# Patient Record
Sex: MALE | Race: WHITE | URBAN - METROPOLITAN AREA
[De-identification: names, ages, dates, MRNs, and addresses within clinical notes are randomized per-mention and may not be internally consistent; named-entity substitution may affect disease eponyms.]

---

## 2019-02-01 ENCOUNTER — HOSPITAL ENCOUNTER (EMERGENCY)
Facility: CLINIC | Age: 39
Discharge: HOME | End: 2019-02-01
Attending: EMERGENCY MEDICINE | Admitting: EMERGENCY MEDICINE
Payer: COMMERCIAL

## 2019-02-01 ENCOUNTER — APPOINTMENT (OUTPATIENT)
Dept: CT IMAGING | Facility: CLINIC | Age: 39
End: 2019-02-01
Payer: COMMERCIAL

## 2019-02-01 VITALS
RESPIRATION RATE: 18 BRPM | HEIGHT: 70 IN | TEMPERATURE: 97.7 F | WEIGHT: 265 LBS | SYSTOLIC BLOOD PRESSURE: 144 MMHG | DIASTOLIC BLOOD PRESSURE: 72 MMHG | HEART RATE: 77 BPM | BODY MASS INDEX: 37.94 KG/M2 | OXYGEN SATURATION: 98 %

## 2019-02-01 DIAGNOSIS — R42 LIGHTHEADEDNESS: ICD-10-CM

## 2019-02-01 DIAGNOSIS — R42 DIZZINESS: ICD-10-CM

## 2019-02-01 LAB
ALBUMIN SERPL-MCNC: 4.2 G/DL (ref 3.4–5)
ALP SERPL-CCNC: 75 U/L (ref 40–150)
ALT SERPL W P-5'-P-CCNC: 37 U/L (ref 0–70)
ANION GAP SERPL CALCULATED.3IONS-SCNC: 3 MMOL/L (ref 3–14)
AST SERPL W P-5'-P-CCNC: 18 U/L (ref 0–45)
BASOPHILS # BLD AUTO: 0.1 10E9/L (ref 0–0.2)
BASOPHILS NFR BLD AUTO: 0.6 %
BILIRUB SERPL-MCNC: 0.7 MG/DL (ref 0.2–1.3)
BUN SERPL-MCNC: 21 MG/DL (ref 7–30)
CALCIUM SERPL-MCNC: 9.4 MG/DL (ref 8.5–10.1)
CHLORIDE SERPL-SCNC: 107 MMOL/L (ref 94–109)
CO2 SERPL-SCNC: 28 MMOL/L (ref 20–32)
CREAT SERPL-MCNC: 1 MG/DL (ref 0.66–1.25)
DIFFERENTIAL METHOD BLD: NORMAL
EOSINOPHIL # BLD AUTO: 0.3 10E9/L (ref 0–0.7)
EOSINOPHIL NFR BLD AUTO: 3.3 %
ERYTHROCYTE [DISTWIDTH] IN BLOOD BY AUTOMATED COUNT: 13.7 % (ref 10–15)
GFR SERPL CREATININE-BSD FRML MDRD: >90 ML/MIN/{1.73_M2}
GLUCOSE SERPL-MCNC: 84 MG/DL (ref 70–99)
HCT VFR BLD AUTO: 45.7 % (ref 40–53)
HGB BLD-MCNC: 16 G/DL (ref 13.3–17.7)
IMM GRANULOCYTES # BLD: 0 10E9/L (ref 0–0.4)
IMM GRANULOCYTES NFR BLD: 0.3 %
LYMPHOCYTES # BLD AUTO: 2.3 10E9/L (ref 0.8–5.3)
LYMPHOCYTES NFR BLD AUTO: 26.8 %
MCH RBC QN AUTO: 30.4 PG (ref 26.5–33)
MCHC RBC AUTO-ENTMCNC: 35 G/DL (ref 31.5–36.5)
MCV RBC AUTO: 87 FL (ref 78–100)
MONOCYTES # BLD AUTO: 0.7 10E9/L (ref 0–1.3)
MONOCYTES NFR BLD AUTO: 7.9 %
NEUTROPHILS # BLD AUTO: 5.3 10E9/L (ref 1.6–8.3)
NEUTROPHILS NFR BLD AUTO: 61.1 %
NRBC # BLD AUTO: 0 10*3/UL
NRBC BLD AUTO-RTO: 0 /100
PLATELET # BLD AUTO: 203 10E9/L (ref 150–450)
POTASSIUM SERPL-SCNC: 3.9 MMOL/L (ref 3.4–5.3)
PROT SERPL-MCNC: 8.2 G/DL (ref 6.8–8.8)
RBC # BLD AUTO: 5.27 10E12/L (ref 4.4–5.9)
SODIUM SERPL-SCNC: 138 MMOL/L (ref 133–144)
TROPONIN I SERPL-MCNC: <0.015 UG/L (ref 0–0.04)
WBC # BLD AUTO: 8.7 10E9/L (ref 4–11)

## 2019-02-01 PROCEDURE — 85025 COMPLETE CBC W/AUTO DIFF WBC: CPT | Performed by: EMERGENCY MEDICINE

## 2019-02-01 PROCEDURE — 99284 EMERGENCY DEPT VISIT MOD MDM: CPT | Mod: 25

## 2019-02-01 PROCEDURE — 80053 COMPREHEN METABOLIC PANEL: CPT | Performed by: EMERGENCY MEDICINE

## 2019-02-01 PROCEDURE — 70450 CT HEAD/BRAIN W/O DYE: CPT

## 2019-02-01 PROCEDURE — 84484 ASSAY OF TROPONIN QUANT: CPT | Performed by: EMERGENCY MEDICINE

## 2019-02-01 RX ORDER — SODIUM CHLORIDE 9 MG/ML
1000 INJECTION, SOLUTION INTRAVENOUS CONTINUOUS
Status: DISCONTINUED | OUTPATIENT
Start: 2019-02-01 | End: 2019-02-01 | Stop reason: HOSPADM

## 2019-02-01 ASSESSMENT — ENCOUNTER SYMPTOMS
WEAKNESS: 0
FATIGUE: 1
LIGHT-HEADEDNESS: 1
NUMBNESS: 0
SHORTNESS OF BREATH: 0

## 2019-02-01 ASSESSMENT — MIFFLIN-ST. JEOR: SCORE: 2123.28

## 2019-02-01 NOTE — ED AVS SNAPSHOT
Emergency Department  [address] [place] [address]  Phone:  [phone]  Fax:  [phone]                                    Mark Jones   MRN: [id number]    Department:   Emergency Department   Date of Visit:  2/1/2019           After Visit Summary Signature Page    I have received my discharge instructions, and my questions have been answered. I have discussed any challenges I see with this plan with the nurse or doctor.    ..........................................................................................................................................  Patient/Patient Representative Signature      ..........................................................................................................................................  Patient Representative Print Name and Relationship to Patient    ..................................................               ................................................  Date                                   Time    ..........................................................................................................................................  Reviewed by Signature/Title    ...................................................              ..............................................  Date                                               Time          22EPIC Rev 08/18

## 2019-02-01 NOTE — ED PROVIDER NOTES
History     Chief Complaint:  Lighteadedness      HPI   Mark Jones is a 38 year old male who presents with lightheadedness. The patient states that around 1000 he was at work in a meeting and he had a 5-10 second episode of feeling extremely lightheaded after turning his head side to side. He felt like he was close to having a syncopal episode. Since then, he has felt slightly lightheaded and fatigued. The patient denies any loss of ability to perform tasks, chest pain, shortness of breath, weakness, numbness, leg pain, or leg swelling. He reports that sometimes he feels lightheaded after not getting enough sleep, but he has never experienced severe lightheadedness like he did today. The patient reports that he had a tooth extraction last week.       CARDIAC RISK FACTORS:  Sex:    Male  Tobacco:   negative   Hypertension:   negative  Hyperlipidemia:  negative  Diabetes:   negative     PE/DVT RISK FACTORS:  Sex:    Male  Hormones:   negative   Tobacco:   negative   Cancer:   negative   Travel:   negative   Surgery:   Tooth extraction last week.  Other immobilization: negative   Personal history:  negative   Family history:  negative       Allergies:  No known allergies     Medications:    The patient is currently on no regular medications.     Past Medical History:    The patient denies any significant past medical history.    Past Surgical History:    The patient does not have any pertinent past surgical history.    Family History:    No past pertinent family history.    Social History:  Patient presents with wife  Negative for tobacco use.  Positive for alcohol use.   Marital Status:       Review of Systems   Constitutional: Positive for fatigue.   Respiratory: Negative for shortness of breath.    Cardiovascular: Negative for chest pain and leg swelling.   Neurological: Positive for light-headedness. Negative for weakness and numbness.   10 point review of systems performed and is negative except as  "above and in HPI.    Physical Exam   First Vitals:  BP: 144/72  Pulse: 77  Temp: 97.7  F (36.5  C)  Resp: 18  Height: 177.8 cm (5' 10\")  Weight: 120.2 kg (265 lb)  SpO2: 98 %      Physical Exam  General: Resting on the gurney, appears comfortable  Head:  The scalp, face, and head appear normal  Mouth/Throat: Mucus membranes are moist  Neck:   No nuchal rigidity   CV:  Regular rate    Normal S1 and S2  No pathological murmur   Resp:  Breath sounds clear and equal bilaterally    Non-labored, no retractions or accessory muscle use    No coarseness    No wheezing   GI:  Abdomen is soft, no rigidity    No tenderness to palpation  MS:  Normal motor assessment of all extremities.    Good capillary refill noted.  Skin:  No rash or lesions noted.  Neuro:  Speech is normal and fluent. No apparent deficit. Cranial nerves II-XII intact by examination. Sensation and strength intact x4  Psych:  Awake. Alert.  Normal affect.      Appropriate interactions.    Emergency Department Course   Imaging:  Radiographic findings were communicated with the patient who voiced understanding of the findings.  CT Head w/o contrast:   No evidence of acute intracranial hemorrhage, mass, or herniation.  per radiology.      Laboratory:  CBC: WBC: 8.7, HGB: 16.0, PLT: 203  CMP: WNL (Creatinine: 1.00)(glucose: 84)  Troponin:  <0.015    Emergency Department Course:  Nursing notes and vitals reviewed.  1252: I performed an exam of the patient as documented above. IV inserted and blood drawn.  Labs and imaging ordered. The patient declines an MRI due to claustrophobia.     1456: I rechecked the patient.  Findings and plan explained to the Patient. Patient discharged home with instructions regarding supportive care, medications, and reasons to return. The importance of close follow-up was reviewed.       Impression & Plan    Medical Decision Making:  Mark Jones is a 38 year old male who presents for evaluation of lightheadedness.  This is clearly " not a vertiginous sensation so would not do further workup for peripheral or central vertigo.  A broad differential of their lightheadedness was considered including anemia, electrolyte abnormality, medication, stroike, ACS, orthostasis, dissection, dehydration, presyncope, arrhythmias, metabolic disturbances, endocrine disturbances, hyperglycemia, infection, etc. No serious etiologies of lightheadedness were found in workup here today.  He did recommend evaluation with MRI as the patient had a recent dental extraction and was concerned for stroke as cause of his symptoms, he adamantly refused and additionally refused CT study with contrast.  Therefore noncontrast head CT was obtained and was unremarkable.  He is aware that this is not an ideal study and does not provide comprehensive evaluation.  He reports feeling better however and is eager for discharge to home.  He will return should he want further evaluation or if his symptoms worsen.      Diagnosis:    ICD-10-CM    1. Dizziness R42    2. Lightheadedness R42        Disposition:  discharged to home      I, Sergio Werner, am serving as a scribe on 2/1/2019 at 12:52 PM to personally document services performed by Crys Chavez MD based on my observations and the provider's statements to me.         Sergio Werner  2/1/2019    EMERGENCY DEPARTMENT       Crys Chavez MD  02/01/19 0524

## 2019-02-01 NOTE — DISCHARGE INSTRUCTIONS
Discharge Instructions  Near-Syncope    Syncope (fainting) is a sudden, short loss of consciousness (passing out spell). People will usually fall to the ground when they faint or slump over if seated.  People may also shake when this happens, and it can sometimes be difficult to tell the difference between syncope and a seizure. At this time, your provider does not find a reason to suspect that your near-fainting spell is a sign of anything dangerous or life-threatening.  However, sometimes the signs of serious illness do not show up right away.     Generally, every Emergency Department visit should have a follow-up clinic visit with either a primary or a specialty clinic/provider. Please follow-up as instructed by your emergency provider today.    Return to the Emergency Department if:  You faint   You have any significant bleeding.  You have chest pain or a fast or irregular heartbeat.  You feel short of breath.  You cough up any blood.  You have abdominal (belly) pain or unusual back pain.  You have ongoing vomiting (throwing up) or diarrhea (loose stools).  You have a black or tarry bowel movement, or blood in the stool or in your vomit.  You have a fever over 101 F.  You lose feeling or cannot move a part of your body or cannot talk normally.  You are confused, have a headache, cannot see well, or have a seizure.  DO NOT DRIVE. CALL 911 INSTEAD!    What can I do to help myself?  Follow any specific instructions that your provider discussed with you.  If you feel light-headed, make sure to sit down right away, even if you have to sit on the floor.  Follow up with your regular medical provider as discussed for further management. This may include lowering your blood pressure medications, insulin or other diabetic medications, checking your blood sugar more frequently, and drinking more fluids, taking medicines for vomiting or diarrhea or getting up slower.  If you were given a prescription for medicine here  today, be sure to read all of the information (including the package insert) that comes with your prescription.  This will include important information about the medicine, its side effects, and any warnings that you need to know about.  The pharmacist who fills the prescription can provide more information and answer questions you may have about the medicine.  If you have questions or concerns that the pharmacist cannot address, please call or return to the Emergency Department.   Remember that you can always come back to the Emergency Department if you are not able to see your regular provider in the amount of time listed above, if you get any new symptoms, or if there is anything that worries you.